# Patient Record
Sex: MALE | Race: OTHER | Employment: FULL TIME | URBAN - METROPOLITAN AREA
[De-identification: names, ages, dates, MRNs, and addresses within clinical notes are randomized per-mention and may not be internally consistent; named-entity substitution may affect disease eponyms.]

---

## 2021-02-05 RX ORDER — ASCORBIC ACID 300 MG
1 TABLET,CHEWABLE ORAL DAILY
COMMUNITY

## 2021-02-05 RX ORDER — OMEPRAZOLE 40 MG/1
40 CAPSULE, DELAYED RELEASE ORAL DAILY
COMMUNITY

## 2021-02-05 RX ORDER — LOSARTAN POTASSIUM 50 MG/1
50 TABLET ORAL DAILY
COMMUNITY

## 2021-02-05 RX ORDER — MV-MIN/FOLIC/K1/LYCOPEN/LUTEIN 300-60 MCG
1 TABLET ORAL DAILY
COMMUNITY

## 2021-02-05 RX ORDER — OMEGA-3 FATTY ACIDS 1000 MG
1 CAPSULE ORAL DAILY
COMMUNITY

## 2021-02-05 RX ORDER — METOPROLOL SUCCINATE 25 MG/1
25 TABLET, EXTENDED RELEASE ORAL DAILY
COMMUNITY

## 2021-02-05 RX ORDER — ASPIRIN 81 MG/1
81 TABLET ORAL DAILY
COMMUNITY

## 2021-02-05 RX ORDER — MULTIVIT WITH MINERALS/HERBS
1 TABLET ORAL DAILY
COMMUNITY

## 2021-02-05 RX ORDER — NITROGLYCERIN 0.4 MG/1
0.4 TABLET SUBLINGUAL
COMMUNITY

## 2021-02-08 ENCOUNTER — HOSPITAL ENCOUNTER (OUTPATIENT)
Age: 56
Setting detail: OUTPATIENT SURGERY
Discharge: HOME | End: 2021-02-08
Attending: INTERNAL MEDICINE | Admitting: INTERNAL MEDICINE
Payer: COMMERCIAL

## 2021-02-08 VITALS
WEIGHT: 206.13 LBS | OXYGEN SATURATION: 98 % | RESPIRATION RATE: 20 BRPM | BODY MASS INDEX: 29.51 KG/M2 | TEMPERATURE: 98 F | SYSTOLIC BLOOD PRESSURE: 125 MMHG | DIASTOLIC BLOOD PRESSURE: 86 MMHG | HEIGHT: 70 IN | HEART RATE: 73 BPM

## 2021-02-08 DIAGNOSIS — R94.39 ABNORMAL STRESS TEST: ICD-10-CM

## 2021-02-08 DIAGNOSIS — R07.9 CHEST PAIN, UNSPECIFIED TYPE: ICD-10-CM

## 2021-02-08 DIAGNOSIS — I10 HYPERTENSION, UNSPECIFIED TYPE: ICD-10-CM

## 2021-02-08 LAB
ALBUMIN SERPL-MCNC: 3.5 G/DL (ref 3.4–5)
ALBUMIN/GLOB SERPL: 1 {RATIO} (ref 0.8–1.7)
ALP SERPL-CCNC: 89 U/L (ref 45–117)
ALT SERPL-CCNC: 38 U/L (ref 16–61)
ANION GAP SERPL CALC-SCNC: 5 MMOL/L (ref 3–18)
AST SERPL-CCNC: 22 U/L (ref 10–38)
BASOPHILS # BLD: 0 K/UL (ref 0–0.1)
BASOPHILS NFR BLD: 0 % (ref 0–2)
BILIRUB SERPL-MCNC: 0.3 MG/DL (ref 0.2–1)
BUN SERPL-MCNC: 13 MG/DL (ref 7–18)
BUN/CREAT SERPL: 13 (ref 12–20)
CALCIUM SERPL-MCNC: 8.9 MG/DL (ref 8.5–10.1)
CHLORIDE SERPL-SCNC: 113 MMOL/L (ref 100–111)
CHOLEST SERPL-MCNC: 124 MG/DL
CO2 SERPL-SCNC: 25 MMOL/L (ref 21–32)
CREAT SERPL-MCNC: 1 MG/DL (ref 0.6–1.3)
DIFFERENTIAL METHOD BLD: ABNORMAL
EOSINOPHIL # BLD: 0.1 K/UL (ref 0–0.4)
EOSINOPHIL NFR BLD: 4 % (ref 0–5)
ERYTHROCYTE [DISTWIDTH] IN BLOOD BY AUTOMATED COUNT: 14.4 % (ref 11.6–14.5)
GLOBULIN SER CALC-MCNC: 3.6 G/DL (ref 2–4)
GLUCOSE SERPL-MCNC: 101 MG/DL (ref 74–99)
HCT VFR BLD AUTO: 47.6 % (ref 36–48)
HDLC SERPL-MCNC: 23 MG/DL (ref 40–60)
HDLC SERPL: 5.4 {RATIO} (ref 0–5)
HGB BLD-MCNC: 15.7 G/DL (ref 13–16)
INR PPP: 1.2 (ref 0.8–1.2)
LDLC SERPL CALC-MCNC: 82.2 MG/DL (ref 0–100)
LIPID PROFILE,FLP: ABNORMAL
LYMPHOCYTES # BLD: 2.1 K/UL (ref 0.9–3.6)
LYMPHOCYTES NFR BLD: 53 % (ref 21–52)
MAGNESIUM SERPL-MCNC: 2.2 MG/DL (ref 1.6–2.6)
MCH RBC QN AUTO: 28.6 PG (ref 24–34)
MCHC RBC AUTO-ENTMCNC: 33 G/DL (ref 31–37)
MCV RBC AUTO: 86.7 FL (ref 74–97)
MONOCYTES # BLD: 0.4 K/UL (ref 0.05–1.2)
MONOCYTES NFR BLD: 10 % (ref 3–10)
NEUTS SEG # BLD: 1.3 K/UL (ref 1.8–8)
NEUTS SEG NFR BLD: 33 % (ref 40–73)
PLATELET # BLD AUTO: 270 K/UL (ref 135–420)
PMV BLD AUTO: 10.4 FL (ref 9.2–11.8)
POTASSIUM SERPL-SCNC: 4.4 MMOL/L (ref 3.5–5.5)
PROT SERPL-MCNC: 7.1 G/DL (ref 6.4–8.2)
PROTHROMBIN TIME: 14.9 SEC (ref 11.5–15.2)
RBC # BLD AUTO: 5.49 M/UL (ref 4.7–5.5)
SODIUM SERPL-SCNC: 143 MMOL/L (ref 136–145)
TRIGL SERPL-MCNC: 94 MG/DL (ref ?–150)
VLDLC SERPL CALC-MCNC: 18.8 MG/DL
WBC # BLD AUTO: 4 K/UL (ref 4.6–13.2)

## 2021-02-08 PROCEDURE — 74011000636 HC RX REV CODE- 636: Performed by: INTERNAL MEDICINE

## 2021-02-08 PROCEDURE — 85610 PROTHROMBIN TIME: CPT

## 2021-02-08 PROCEDURE — 74011000250 HC RX REV CODE- 250: Performed by: INTERNAL MEDICINE

## 2021-02-08 PROCEDURE — C1769 GUIDE WIRE: HCPCS | Performed by: INTERNAL MEDICINE

## 2021-02-08 PROCEDURE — 80053 COMPREHEN METABOLIC PANEL: CPT

## 2021-02-08 PROCEDURE — 77030013797 HC KT TRNSDUC PRSSR EDWD -A: Performed by: INTERNAL MEDICINE

## 2021-02-08 PROCEDURE — 77030016699 HC CATH ANGI DX INFN1 CARD -A: Performed by: INTERNAL MEDICINE

## 2021-02-08 PROCEDURE — 83735 ASSAY OF MAGNESIUM: CPT

## 2021-02-08 PROCEDURE — 93005 ELECTROCARDIOGRAM TRACING: CPT

## 2021-02-08 PROCEDURE — 99153 MOD SED SAME PHYS/QHP EA: CPT | Performed by: INTERNAL MEDICINE

## 2021-02-08 PROCEDURE — 80061 LIPID PANEL: CPT

## 2021-02-08 PROCEDURE — 74011250636 HC RX REV CODE- 250/636: Performed by: INTERNAL MEDICINE

## 2021-02-08 PROCEDURE — 93458 L HRT ARTERY/VENTRICLE ANGIO: CPT | Performed by: INTERNAL MEDICINE

## 2021-02-08 PROCEDURE — 77030004522 HC CATH ANGI DX EXPO BSC -A: Performed by: INTERNAL MEDICINE

## 2021-02-08 PROCEDURE — 93458 L HRT ARTERY/VENTRICLE ANGIO: CPT

## 2021-02-08 PROCEDURE — 74011250637 HC RX REV CODE- 250/637: Performed by: INTERNAL MEDICINE

## 2021-02-08 PROCEDURE — 99152 MOD SED SAME PHYS/QHP 5/>YRS: CPT | Performed by: INTERNAL MEDICINE

## 2021-02-08 PROCEDURE — 77030004521 HC CATH ANGI DX COOK -B: Performed by: INTERNAL MEDICINE

## 2021-02-08 PROCEDURE — 77030008543 HC TBNG MON PRSS MRTM -A: Performed by: INTERNAL MEDICINE

## 2021-02-08 PROCEDURE — 85025 COMPLETE CBC W/AUTO DIFF WBC: CPT

## 2021-02-08 PROCEDURE — 77030042317 HC BND COMPR HEMSTAT -B: Performed by: INTERNAL MEDICINE

## 2021-02-08 PROCEDURE — C1894 INTRO/SHEATH, NON-LASER: HCPCS | Performed by: INTERNAL MEDICINE

## 2021-02-08 RX ORDER — FENTANYL CITRATE 50 UG/ML
INJECTION, SOLUTION INTRAMUSCULAR; INTRAVENOUS AS NEEDED
Status: DISCONTINUED | OUTPATIENT
Start: 2021-02-08 | End: 2021-02-08 | Stop reason: HOSPADM

## 2021-02-08 RX ORDER — MIDAZOLAM HYDROCHLORIDE 1 MG/ML
INJECTION, SOLUTION INTRAMUSCULAR; INTRAVENOUS AS NEEDED
Status: DISCONTINUED | OUTPATIENT
Start: 2021-02-08 | End: 2021-02-08 | Stop reason: HOSPADM

## 2021-02-08 RX ORDER — LIDOCAINE HYDROCHLORIDE 10 MG/ML
INJECTION INFILTRATION; PERINEURAL AS NEEDED
Status: DISCONTINUED | OUTPATIENT
Start: 2021-02-08 | End: 2021-02-08 | Stop reason: HOSPADM

## 2021-02-08 RX ORDER — HEPARIN SODIUM 200 [USP'U]/100ML
INJECTION, SOLUTION INTRAVENOUS
Status: COMPLETED | OUTPATIENT
Start: 2021-02-08 | End: 2021-02-08

## 2021-02-08 RX ORDER — SODIUM CHLORIDE 0.9 % (FLUSH) 0.9 %
5-40 SYRINGE (ML) INJECTION EVERY 8 HOURS
Status: CANCELLED | OUTPATIENT
Start: 2021-02-08

## 2021-02-08 RX ORDER — SODIUM CHLORIDE 9 MG/ML
50 INJECTION, SOLUTION INTRAVENOUS CONTINUOUS
Status: DISCONTINUED | OUTPATIENT
Start: 2021-02-08 | End: 2021-02-08 | Stop reason: HOSPADM

## 2021-02-08 RX ORDER — HEPARIN SODIUM 1000 [USP'U]/ML
INJECTION, SOLUTION INTRAVENOUS; SUBCUTANEOUS AS NEEDED
Status: DISCONTINUED | OUTPATIENT
Start: 2021-02-08 | End: 2021-02-08 | Stop reason: HOSPADM

## 2021-02-08 RX ORDER — SODIUM CHLORIDE 0.9 % (FLUSH) 0.9 %
5-40 SYRINGE (ML) INJECTION AS NEEDED
Status: CANCELLED | OUTPATIENT
Start: 2021-02-08

## 2021-02-08 RX ORDER — VERAPAMIL HYDROCHLORIDE 2.5 MG/ML
INJECTION, SOLUTION INTRAVENOUS AS NEEDED
Status: DISCONTINUED | OUTPATIENT
Start: 2021-02-08 | End: 2021-02-08 | Stop reason: HOSPADM

## 2021-02-08 RX ORDER — GUAIFENESIN 100 MG/5ML
81 LIQUID (ML) ORAL
Status: COMPLETED | OUTPATIENT
Start: 2021-02-08 | End: 2021-02-08

## 2021-02-08 RX ADMIN — SODIUM CHLORIDE 50 ML/HR: 900 INJECTION, SOLUTION INTRAVENOUS at 07:51

## 2021-02-08 RX ADMIN — ASPIRIN 81 MG: 81 TABLET, CHEWABLE ORAL at 08:00

## 2021-02-08 NOTE — Clinical Note
TRANSFER - IN REPORT:     Verbal report received from: rn.     Report consisted of patient's Situation, Background, Assessment and   Recommendations(SBAR). Opportunity for questions and clarification was provided. Assessment completed upon patient's arrival to unit and care assumed. Patient transported with a Registered Nurse and [address] [place] [address] / [facility] [employer].

## 2021-02-08 NOTE — PROGRESS NOTES
Cardiology Progress Note        Patient: Kymberly Keith        Sex: male          DOA: 2/8/2021  YOB: 1965      Age:  63 y.o.        LOS:  LOS: 0 days   Assessment/Plan   Date of Surgery Update:  Kymberly Keith was seen and examined. History and physical has been reviewed. The patient has been examined.  There have been no significant clinical changes since the completion of the originally dated History and Physical.    Signed By: Aurora Cameron MD     February 8, 2021 9:40 AM             Signed By: Aurora Cameron MD     February 8, 2021

## 2021-02-08 NOTE — PROGRESS NOTES
Pt is all prepped and ready for procedure. 1030 Pt back to care unit S/P cardiac cath. Pt awake and alert and tolerated procedure well. Tr band to rt wrist with immobilizer in place. Site with no bleeding nor hematoma. 1150 Tr band removed and tolerated well. Tegaderm dressing with sterile 2x2  applied to site. Rt wrist immobilizer back in place. 1300 Discharge instructions reviewed with pt and he verbalized all understandings.      Pt escorted to car and left with care giver in stable

## 2021-02-08 NOTE — Clinical Note
TRANSFER - OUT REPORT:     Verbal report given to: rn.     Report consisted of patient's Situation, Background, Assessment and   Recommendations(SBAR). Opportunity for questions and clarification was provided. Patient transported with a Registered Nurse and [address] [place] [address] / [facility]. Patient transported to: care unit.

## 2021-02-08 NOTE — ROUTINE PROCESS
Cardiac Cath Lab:  Pre Procedure Chart Check Patients chart was accessed and reviewed for possible and/or scheduled procedure. Creatinine Clearance: 
Serum creatinine: 1 mg/dL 02/08/21 3565 Estimated creatinine clearance: 94.7 mL/min Total Contrast  Load: 
3 x estimated clearance amount=  284.1ml 
 
75% of Contrast Load: 0.75 x Total Contrast Load=    213ml Recent Labs 02/08/21 
9690 02/08/21 
0740 WBC  --  4.0*  
RBC  --  5.49 HCT  --  47.6 HGB  --  15.7 PLT  --  270 INR 1.2  --   
PTP 14.9  --   
  --   
K 4.4  --   
BUN 13  --   
CREA 1.00  --   
GFRAA >60  --   
GFRNA >60  --   
CA 8.9  --   
 
 
BMI: Body mass index is 29.58 kg/m². ALLERGIES: No Known Allergies Lines: 
  
  
Peripheral IV 02/08/21 Left Hand (Active) Site Assessment Clean, dry, & intact 02/08/21 0745 Phlebitis Assessment 0 02/08/21 0745 Infiltration Assessment 0 02/08/21 0745 Dressing Status Clean, dry, & intact 02/08/21 0745 Dressing Type Tape;Transparent 02/08/21 0745 Hub Color/Line Status End cap changed; Capped; Infusing;Flushed;Pink 02/08/21 0745 Action Taken Open ports on tubing capped 02/08/21 0745 Alcohol Cap Used Yes 02/08/21 0745 History: 
 
History reviewed. No pertinent past medical history. Past Surgical History:  
Procedure Laterality Date  HX HEENT Left   
 lt eye surgery There is no problem list on file for this patient.

## 2021-02-08 NOTE — DISCHARGE INSTRUCTIONS
Cardiac Catheterization/Angiography Discharge Instructions    *Check the puncture site frequently for swelling or bleeding. If you see any bleeding, lie down and apply pressure over the area with a clean town or washcloth. Notify your doctor for any redness, swelling, drainage or oozing from the puncture site. Notify your doctor for any fever or chills. *If the leg or arm with the puncture becomes cold, numb or painful, call Dr Rupa Hoskins    *Activity should be limited for the next 48 hours. Climb stairs as little as possible and avoid any stooping, bending or strenuous activity for 48 hours. No heavy lifting (anything over 10 pounds) for three days. *Do not drive for 48 hours. *You may resume your usual diet. Drink more fluids than usual.    *Have a responsible person drive you home and stay with you for at least 24 hours after your heart catheterization/angiography. *You may remove the bandage from your Right and Arm in 24 hours. You may shower in 24 hours. No tub baths, hot tubs or swimming for one week. Do not place any lotions, creams, powders, ointments over the puncture site for one week. You may place a clean band-aid over the puncture site each day for 5 days. Change this daily. DISCHARGE SUMMARY from Nurse    PATIENT INSTRUCTIONS:    After general anesthesia or intravenous sedation, for 24 hours or while taking prescription Narcotics:  · Limit your activities  · Do not drive and operate hazardous machinery  · Do not make important personal or business decisions  · Do  not drink alcoholic beverages  · If you have not urinated within 8 hours after discharge, please contact your surgeon on call.     Report the following to your surgeon:  · Excessive pain, swelling, redness or odor of or around the surgical area  · Temperature over 100.5  · Nausea and vomiting lasting longer than 4 hours or if unable to take medications  · Any signs of decreased circulation or nerve impairment to extremity: change in color, persistent  numbness, tingling, coldness or increase pain  · Any questions    What to do at Home:  Recommended activity: No lifting, Driving, or Strenuous exercise for 48 hours      *  Please give a list of your current medications to your Primary Care Provider. *  Please update this list whenever your medications are discontinued, doses are      changed, or new medications (including over-the-counter products) are added. *  Please carry medication information at all times in case of emergency situations. These are general instructions for a healthy lifestyle:    No smoking/ No tobacco products/ Avoid exposure to second hand smoke  Surgeon General's Warning:  Quitting smoking now greatly reduces serious risk to your health. Obesity, smoking, and sedentary lifestyle greatly increases your risk for illness    A healthy diet, regular physical exercise & weight monitoring are important for maintaining a healthy lifestyle    You may be retaining fluid if you have a history of heart failure or if you experience any of the following symptoms:  Weight gain of 3 pounds or more overnight or 5 pounds in a week, increased swelling in our hands or feet or shortness of breath while lying flat in bed. Please call your doctor as soon as you notice any of these symptoms; do not wait until your next office visit. The discharge information has been reviewed with the patient and caregiver. The patient and caregiver verbalized understanding. Discharge medications reviewed with the patient and caregiver and appropriate educational materials and side effects teaching were provided.       Patient armband removed and shredded    ___________________________________________________________________________________________________________________________________

## 2021-02-09 LAB
ATRIAL RATE: 68 BPM
CALCULATED P AXIS, ECG09: 40 DEGREES
CALCULATED R AXIS, ECG10: 49 DEGREES
CALCULATED T AXIS, ECG11: 27 DEGREES
CRD SYSTOLIC BP: 116
DIAGNOSIS, 93000: NORMAL
P-R INTERVAL, ECG05: 158 MS
Q-T INTERVAL, ECG07: 370 MS
QRS DURATION, ECG06: 92 MS
QTC CALCULATION (BEZET), ECG08: 393 MS
VENTRICULAR RATE, ECG03: 68 BPM

## (undated) DEVICE — PROCEDURE KIT FLUID MGMT 10 FR CUST MAINFOLD

## (undated) DEVICE — CATHETER ANGIO NTR 0.045 INX5 FRX100 CM THRULUMEN EXPO

## (undated) DEVICE — GUIDEWIRE VASC L260CM DIA0.035IN RAD 3MM J TIP L7CM PTFE

## (undated) DEVICE — RADIFOCUS GLIDEWIRE: Brand: GLIDEWIRE

## (undated) DEVICE — Device

## (undated) DEVICE — PACK PROCEDURE SURG CATH CUST

## (undated) DEVICE — GLIDESHEATH SLENDER STAINLESS STEEL KIT: Brand: GLIDESHEATH SLENDER

## (undated) DEVICE — TUBING PRSS MON L24IN PVC RIG NONEXPANDING M TO FEM CONN

## (undated) DEVICE — DRAPE,ANGIO,BRACH,STERILE,38X44: Brand: MEDLINE

## (undated) DEVICE — PRESSURE MONITORING SET: Brand: TRUWAVE

## (undated) DEVICE — CATH DIAG FR4 EXPO 5FRX100CM -- EXPO

## (undated) DEVICE — STOPCOCK TRNSDUC 500PSI 3 W ROT M LUER LT BLU OFF HNDL R

## (undated) DEVICE — SHIELD RAD 14X16 IN W/ SCOOP ABSORBER

## (undated) DEVICE — TORCON NB, ADVANTAGE CATHETER: Brand: TORCON NB

## (undated) DEVICE — SENSOR PLSE OXMTR AD CBL L36IN ADH FRM FIT SPO2 DISP